# Patient Record
(demographics unavailable — no encounter records)

---

## 2024-12-17 NOTE — ASSESSMENT
[FreeTextEntry1] : 72 yo gentleman with Pmhx of CAD, GERD, Crohn's disease, COVID 19 4/2020, referred for evaluation of anemia.  Patient was infected with COVID 19 in April 2020, complicated by arthralgias. Denies fevers, night sweats, weight loss.  9/30/2020: Urine- IgM kappa band identified. IgM 439  12/17/24- WBC 9.99, Hb 12.7 g/dl, Hct 38.7%, MCV 83.6,   1/4/24-  M spike 0.6 g/dl. Kappa 2.14, lambda 1.11, Kappa/lambda ratio 1.93.  - Hx of Crohn's Disease. On Pentasa.  -HLD: On Atorvastatin.  -Patient reports having prostatectomy on 3/2024.  Doing kegal exercises for incontinence.   Greater than 50% of the encounter time was spent on counseling and coordination of care for  [facility]    and I have spent 30   minutes of face-to-face time with the patient.  RTC 12 months.

## 2024-12-17 NOTE — HISTORY OF PRESENT ILLNESS
[0 - No Distress] : Distress Level: 0 [80: Normal activity with effort; some signs or symptoms of disease.] : 80: Normal activity with effort; some signs or symptoms of disease.  [de-identified] : 71 yo gentleman with Pmhx of  CAD, GERD, Crohn's disease, COVID 19 4/2020, referred for evaluation of anemia. \par  \par  Patient was infected with COVID 19 in April 2020, complicated by arthralgias. Patient is on  taper of prednisone with improvement of symptoms. Denies fevers, night sweats, weight loss. \par  \par  9/30/2020: Urine- IgM kappa band identified. IgM 439\par  kappa 1.99/ lambda 0.94= 2.12. \par  Protein electrophoresis serum- M spike 0.5 g/dl. MADELEINE IgM kappa. \par  WBC 11.25, Hb 12.1g/dl, Hct 39.1%,  MCV 83.9, RDW 14.6%, , neutrophils 7.76, lymphocytes 2.32, monocytes 0.91, eos 0.1, basos 0.12.  [de-identified] : Patient reports having prostatectomy on 3/2024.  Doing kegal exercises for incontinence.  Denies fevers, night sweats or weight loss.  12/17/24- WBC 9.99, Hb 12.7 g/dl, Hct 38.7%, MCV 83.6,   1/4/24-  M spike 0.6 g/dl. Kappa 2.14, lambda 1.11, Kappa/lambda ratio 1.93.  No other changes in medical, surgical or social history since 1/2/24

## 2024-12-17 NOTE — CONSULT LETTER
[Dear  ___] : Dear  [unfilled], [Consult Letter:] : I had the pleasure of evaluating your patient, [unfilled]. [Please see my note below.] : Please see my note below. [Consult Closing:] : Thank you very much for allowing me to participate in the care of this patient.  If you have any questions, please do not hesitate to contact me. [Sincerely,] : Sincerely, [FreeTextEntry2] : Dr Mindy Cerda

## 2024-12-17 NOTE — PHYSICAL EXAM
[Restricted in physically strenuous activity but ambulatory and able to carry out work of a light or sedentary nature] : Status 1- Restricted in physically strenuous activity but ambulatory and able to carry out work of a light or sedentary nature, e.g., light house work, office work [Normal] : affect appropriate [de-identified] : right neck nodule approx 1-1.5 cm in diameter, hard to touch.

## 2024-12-17 NOTE — REVIEW OF SYSTEMS
[Joint Pain] : joint pain [Negative] : Allergic/Immunologic [FreeTextEntry9] : sciatica of the right hip.